# Patient Record
(demographics unavailable — no encounter records)

---

## 2017-12-01 NOTE — EMERGENCY DEPARTMENT REPORT
Upper Extremity





- HPI


Chief Complaint: Extremity Injury, Upper


Stated Complaint: LEFT FINGER INJURY


Time Seen by Provider: 12/01/17 16:07


Upper Extremity: Left Index Finger (she reports that he slammed his left index 

finger in door, pain, swelling)


Occurred When: Today


Mechanism: Hit with Object


Severity: severe (9/10, throbbing, better with rest and worse with movement.  

No over-the-counter medication taken for pain)


Symptoms: Yes Pain with Movement (index finger, left), Yes Limited Range of 

Movement (index finger,left), Yes Swelling (index finger,left), No Deformity, 

No Numbness, No Weakness, No Bruising/Ecchymosis, No Laceration or Abrasion


Other History: Pt here complaining of left index finger pain after slamming it 

in door today.  Reports pain 9 out of 10 that source of movement better with 

rest.  No over-the-counter pain medication taken prior to coming to the 

emergency room.  Patient denies any radiation of pain or any numbness or 

tingling.





ED Review of Systems


ROS: 


Stated complaint: LEFT FINGER INJURY


Other details as noted in HPI





Comment: All other systems reviewed and negative


Constitutional: no symptoms reported


Cardiovascular: denies: chest pain, palpitations, edema, syncope


Gastrointestinal: denies: nausea, vomiting


Musculoskeletal: joint swelling, arthralgia.  denies: back pain, myalgia


Skin: denies: rash


Neurological: denies: headache, weakness, numbness, paresthesias, confusion, 

abnormal gait, vertigo





ED Past Medical Hx





- Past Medical History


Previous Medical History?: Yes


Hx Asthma: Yes





- Surgical History


Past Surgical History?: No





- Family History


Family history: no significant





- Social History


Smoking Status: Never Smoker


Substance Use Type: None





- Medications


Home Medications: 


 Home Medications











 Medication  Instructions  Recorded  Confirmed  Last Taken  Type


 


Ibuprofen [Motrin] 600 mg PO Q8H PRN 5 Days #15 tablet 12/01/17  Unknown Rx














Upper Extremity Exam





- Exam


General: 


Vital signs noted. No distress. Alert and acting appropriately.


This is a 38-year-old male well-nourished well-developed in no acute distress


Head and Torso: No HEENT Abnormality, No Neck Tenderness, No Chest/Lungs 

Abnormality, No Abdominal Tenderness, No Back Tenderness


Shoulder Exam: Yes Normal Range of Motion in Shoulder, No Shoulder Tenderness, 

No Clavicle Tenderness, No Shoulder Deformity, No AC Joint Tenderness


Arm Exam: No Arm/Humerus Tenderness, No Arm Deformity


Elbow: Yes Normal Range of Motion in Elbow, No Elbow Tenderness, No Elbow 

Deformity


Forearm: No Forearm Tenderness, No Forearm Deformity, No Pain with Pronation, 

No Pain with Supination


Wrist: Yes Normal ROM in Wrist, No Wrist Tenderness, No Wrist Deformity, No 

Snuffbox Tenderness, No Pain with Axial Thumb Compression


Hand: Yes Normal ROM in Digit(s), No Hand Tenderness, No Hand Deformity, No 

Digit Tenderness, No Digit(s) Deformity (patient with swelling to left index 

finger.  Positive tenderness without any bony abnormality), No Tendon 

Dysfunction


CMS Exam: Yes Broken Skin (with small abrasion to left index finger proximal 

phalanx and distal phalanx), Yes Normal Distal Pulses, Yes Normal Capillary 

Refill, Yes Normal Distal Sensation





ED Course


 Vital Signs











  12/01/17





  14:41


 


Temperature 98.8 F


 


Pulse Rate 59 L


 


Respiratory 18





Rate 


 


Blood Pressure 117/75


 


O2 Sat by Pulse 97





Oximetry 














- Reevaluation(s)


Reevaluation #1: 





12/01/17 17:35


Patient given strict update tetanus, left index finger abrasions cleansed with 

normal saline and Neosporin ointment placed inside.  Patient with contusion to 

left index finger and metal finger splint placed inside.  He was given Norco 5/

325 2 tablets in the emergency room for pain.  See procedure note for detail on 

splinted.





- Orthopedic Splinting/Casting


  ** Injury #1


Side: left


Upper Extremity Injury Location: finger (index finger)


Upper Extremity Immobilizer: aluminum form splint, finger (other) (left index 

finger)


Additional Comments: 





Patient with good neurovascular check status post splint placement.





ED Medical Decision Making





- Radiology Data


Radiology results: report reviewed





X-ray of left index finger reveal no fracture or dislocation.  Patient with 

soft tissue swelling to left index finger.





- Medical Decision Making





ED Course: An status post left index finger injury.  He is complaining of pain 

and swelling to left index finger after slamming door on finger.  X-ray report 

revealed patient without any bony abnormality and with soft tissue swelling.  I 

discussed the patient that his x-ray was negative for any bone fracture but he 

has contusion which is swelling on the bone to his left index finger.  Patient 

was given Norco 5/325 2 tablets in the emergency room.  He has 2 abrasion to 

his left index finger to mid and distal phalanx.  Area cleansed with normal 

saline and Neosporin ointment placed inside.  Patient given Boostrix 0.5 mL to 

update tetanus and finger metal splint placed.  Rice protocol explained to 

patient . He voiced understanding of  discharge instruction, diagnosis and 

treatment plan and discharged home in stable condition with prescription for 

Motrin and to follow-up with orthopedic doctor if he continues to have pain.


Critical care attestation.: 


If time is entered above; I have spent that time in minutes in the direct care 

of this critically ill patient, excluding procedure time.








ED Disposition


Clinical Impression: 


 Finger pain, left





Contusion of left index finger


Qualifiers:


 Encounter type: initial encounter Damage to nail status: without damage 

Qualified Code(s): S60.022A - Contusion of left index finger without damage to 

nail, initial encounter





Disposition: DC-01 TO HOME OR SELFCARE


Is pt being admited?: No


Does the pt Need Aspirin: No


Condition: Stable


Instructions:  Contusion in Adults (ED), Splint Care (ED), Finger Sprain (ED), 

Arthralgia (ED), RICE Therapy (ED)


Additional Instructions: 


Keep  affected areas clean and dry


Follow instructions on RICE therapy


Take motrin as prescribed


Follow up with Orthopedist as discussed


Prescriptions: 


Ibuprofen [Motrin] 600 mg PO Q8H PRN 5 Days #15 tablet


 PRN Reason: Pain


Referrals: 


PRIMARY CARE,MD [Primary Care Provider] - 3-5 Days


ANITA OWENS MD [Staff Physician] - 3-5 Days


Forms:  Accompanied Note, Work/School Release Form(ED)

## 2017-12-01 NOTE — XRAY REPORT
LEFT FINGERS, 3 VIEWS



History: Rule out fracture.



Findings: Normal bone mineralization. No acute osseous findings or 

joint pathology. Normal soft tissues.



Impression: Normal left fingers.